# Patient Record
Sex: FEMALE | Race: WHITE | NOT HISPANIC OR LATINO
[De-identification: names, ages, dates, MRNs, and addresses within clinical notes are randomized per-mention and may not be internally consistent; named-entity substitution may affect disease eponyms.]

---

## 2020-07-23 PROBLEM — Z00.00 ENCOUNTER FOR PREVENTIVE HEALTH EXAMINATION: Status: ACTIVE | Noted: 2020-07-23

## 2020-07-24 ENCOUNTER — APPOINTMENT (OUTPATIENT)
Dept: OTOLARYNGOLOGY | Facility: CLINIC | Age: 55
End: 2020-07-24
Payer: MEDICARE

## 2020-07-24 VITALS — HEIGHT: 58 IN | SYSTOLIC BLOOD PRESSURE: 144 MMHG | HEART RATE: 72 BPM | DIASTOLIC BLOOD PRESSURE: 81 MMHG

## 2020-07-24 DIAGNOSIS — H90.0 CONDUCTIVE HEARING LOSS, BILATERAL: ICD-10-CM

## 2020-07-24 DIAGNOSIS — H61.23 IMPACTED CERUMEN, BILATERAL: ICD-10-CM

## 2020-07-24 PROCEDURE — 99204 OFFICE O/P NEW MOD 45 MIN: CPT | Mod: 25

## 2020-07-24 PROCEDURE — 69210 REMOVE IMPACTED EAR WAX UNI: CPT

## 2020-07-24 NOTE — ASSESSMENT
[FreeTextEntry1] : 53 yo female with bilateral acute hearing loss.  On exam significant bilateral cerumen impaction cleaned away.  Pt noted improvement 100% on the right and 80% on the left.  Pt to use debrox otic solution and then follow up in 10 days for repeat evaluation and cleaning.\par \par -debrox otic\par -fu 10 days for re-evaluation.  If still with hearing loss and clean ear then will plan audio/tymp

## 2020-07-24 NOTE — HISTORY OF PRESENT ILLNESS
[de-identified] : 55 yo female who presents with 10 day history of significant bilateral hearing loss.  No tinnitis, vertiginous symptoms, otorrhea or otalgia.  No loud noise exposure.  No q-tip use.  No recent URI.  No ENT issues otherwise.
decreased po, decreassed uo,/FEVER

## 2020-07-24 NOTE — PHYSICAL EXAM
[Midline] : trachea located in midline position [Normal] : orientation to person, place, and time: normal [de-identified] : completely impacted cerumen bilaterally [FreeTextEntry1] : Patient hardly able to communicate secondary to hearing loss.

## 2020-08-04 ENCOUNTER — APPOINTMENT (OUTPATIENT)
Dept: OTOLARYNGOLOGY | Facility: CLINIC | Age: 55
End: 2020-08-04